# Patient Record
Sex: FEMALE | Race: ASIAN | Employment: UNEMPLOYED | URBAN - METROPOLITAN AREA
[De-identification: names, ages, dates, MRNs, and addresses within clinical notes are randomized per-mention and may not be internally consistent; named-entity substitution may affect disease eponyms.]

---

## 2017-04-12 ENCOUNTER — OFFICE VISIT (OUTPATIENT)
Dept: FAMILY MEDICINE CLINIC | Facility: CLINIC | Age: 47
End: 2017-04-12

## 2017-04-12 ENCOUNTER — LAB ENCOUNTER (OUTPATIENT)
Dept: LAB | Age: 47
End: 2017-04-12
Attending: FAMILY MEDICINE
Payer: COMMERCIAL

## 2017-04-12 VITALS
BODY MASS INDEX: 18.78 KG/M2 | OXYGEN SATURATION: 100 % | DIASTOLIC BLOOD PRESSURE: 70 MMHG | HEART RATE: 76 BPM | SYSTOLIC BLOOD PRESSURE: 114 MMHG | RESPIRATION RATE: 16 BRPM | TEMPERATURE: 99 F | HEIGHT: 60.75 IN | WEIGHT: 98.19 LBS

## 2017-04-12 DIAGNOSIS — R63.4 UNEXPLAINED WEIGHT LOSS: Primary | ICD-10-CM

## 2017-04-12 DIAGNOSIS — N92.1 MENORRHAGIA WITH IRREGULAR CYCLE: ICD-10-CM

## 2017-04-12 DIAGNOSIS — K21.00 GASTROESOPHAGEAL REFLUX DISEASE WITH ESOPHAGITIS: ICD-10-CM

## 2017-04-12 DIAGNOSIS — R63.4 UNEXPLAINED WEIGHT LOSS: ICD-10-CM

## 2017-04-12 PROCEDURE — 83550 IRON BINDING TEST: CPT

## 2017-04-12 PROCEDURE — 86800 THYROGLOBULIN ANTIBODY: CPT

## 2017-04-12 PROCEDURE — 83540 ASSAY OF IRON: CPT

## 2017-04-12 PROCEDURE — 86376 MICROSOMAL ANTIBODY EACH: CPT

## 2017-04-12 PROCEDURE — 82607 VITAMIN B-12: CPT

## 2017-04-12 PROCEDURE — 84443 ASSAY THYROID STIM HORMONE: CPT

## 2017-04-12 PROCEDURE — 82306 VITAMIN D 25 HYDROXY: CPT

## 2017-04-12 PROCEDURE — 80053 COMPREHEN METABOLIC PANEL: CPT

## 2017-04-12 PROCEDURE — 99214 OFFICE O/P EST MOD 30 MIN: CPT | Performed by: FAMILY MEDICINE

## 2017-04-12 PROCEDURE — 84481 FREE ASSAY (FT-3): CPT

## 2017-04-12 PROCEDURE — 82728 ASSAY OF FERRITIN: CPT

## 2017-04-12 PROCEDURE — 84439 ASSAY OF FREE THYROXINE: CPT

## 2017-04-12 PROCEDURE — 85025 COMPLETE CBC W/AUTO DIFF WBC: CPT

## 2017-04-12 PROCEDURE — 36415 COLL VENOUS BLD VENIPUNCTURE: CPT

## 2017-04-12 RX ORDER — SUCRALFATE 1 G/1
1 TABLET ORAL
Qty: 120 TABLET | Refills: 1 | Status: SHIPPED | OUTPATIENT
Start: 2017-04-12 | End: 2018-10-05 | Stop reason: ALTCHOICE

## 2017-04-12 NOTE — PROGRESS NOTES
HPI:   Alen De Paz is a 50year old female here with gi issues, chest pain and heavy menses      Pt reports a normal cardiac work up recently at [facility]; pt is still having the chest pain. bp was high at times.      Pt was not happy living ove apparent distress  CARDIO: RRR without murmur  LUNGS: clear to auscultation  NECK: supple,no adenopathy,no thyromegaly  HEENT: atraumatic, normocephalic,ears and throat are clear  EYES:PERRLA, EOMI, normal,conjunctiva are clear  SKIN: norashes,no suspiciou

## 2017-04-17 ENCOUNTER — PATIENT MESSAGE (OUTPATIENT)
Dept: FAMILY MEDICINE CLINIC | Facility: CLINIC | Age: 47
End: 2017-04-17

## 2017-04-17 NOTE — TELEPHONE ENCOUNTER
From: Reji Young  To: Matthew Newberry DO  Sent: 4/17/2017 7:44 AM CDT  Subject: Prescription Question    Dear Dr. Candelaria Hicks,    I noticed that I am deficient in one of thyroid hormones and iron.  I have bumped up the dose of iron during my period days but

## 2017-04-27 ENCOUNTER — HOSPITAL ENCOUNTER (OUTPATIENT)
Dept: ULTRASOUND IMAGING | Age: 47
Discharge: HOME | End: 2017-04-27
Attending: FAMILY MEDICINE
Payer: COMMERCIAL

## 2017-04-27 DIAGNOSIS — K21.00 GASTROESOPHAGEAL REFLUX DISEASE WITH ESOPHAGITIS: ICD-10-CM

## 2017-04-27 DIAGNOSIS — N92.1 MENORRHAGIA WITH IRREGULAR CYCLE: ICD-10-CM

## 2017-04-27 DIAGNOSIS — R63.4 UNEXPLAINED WEIGHT LOSS: ICD-10-CM

## 2017-04-27 PROCEDURE — 76700 US EXAM ABDOM COMPLETE: CPT

## 2017-04-27 PROCEDURE — 76856 US EXAM PELVIC COMPLETE: CPT

## 2017-04-27 PROCEDURE — 76830 TRANSVAGINAL US NON-OB: CPT

## 2017-04-27 PROCEDURE — 93975 VASCULAR STUDY: CPT

## 2017-05-02 ENCOUNTER — OFFICE VISIT (OUTPATIENT)
Dept: FAMILY MEDICINE CLINIC | Facility: CLINIC | Age: 47
End: 2017-05-02

## 2017-05-02 VITALS
HEIGHT: 61 IN | SYSTOLIC BLOOD PRESSURE: 100 MMHG | BODY MASS INDEX: 19.07 KG/M2 | RESPIRATION RATE: 18 BRPM | WEIGHT: 101 LBS | TEMPERATURE: 99 F | HEART RATE: 72 BPM | DIASTOLIC BLOOD PRESSURE: 60 MMHG

## 2017-05-02 DIAGNOSIS — R10.13 EPIGASTRIC PAIN: ICD-10-CM

## 2017-05-02 DIAGNOSIS — D50.0 IRON DEFICIENCY ANEMIA DUE TO CHRONIC BLOOD LOSS: ICD-10-CM

## 2017-05-02 DIAGNOSIS — R63.4 UNEXPLAINED WEIGHT LOSS: ICD-10-CM

## 2017-05-02 DIAGNOSIS — D25.9 UTERINE LEIOMYOMA, UNSPECIFIED LOCATION: Primary | ICD-10-CM

## 2017-05-02 PROCEDURE — 99214 OFFICE O/P EST MOD 30 MIN: CPT | Performed by: FAMILY MEDICINE

## 2017-05-02 RX ORDER — LACTOBACIL 2/BIFIDO 1/S.THERMO 450B CELL
1 PACKET (EA) ORAL 2 TIMES DAILY
Qty: 180 PACKET | Refills: 3 | Status: SHIPPED | OUTPATIENT
Start: 2017-05-02

## 2017-05-02 RX ORDER — LACTOBACIL 2/BIFIDO 1/S.THERMO 450B CELL
1 PACKET (EA) ORAL DAILY
Qty: 90 PACKET | Refills: 1 | Status: SHIPPED | OUTPATIENT
Start: 2017-05-02 | End: 2017-05-02

## 2017-05-02 NOTE — PROGRESS NOTES
HPI:   Isaiah Kincaid is a 50year old female here with gi issues and heavy menses      Pt reports a normal cardiac work up recently at [facility]; pt is still having the chest pain. bp was high at times.      [place] / [place] 04/28/2017  Body mass index is 19.09 kg/(m^2).    GENERAL: alert and oriented X 3, well developed, well nourished,in no apparent distress  CARDIO: RRR without murmur  LUNGS: clear to auscultation  NECK: supple,no adenopathy,no thyromegaly  HEENT: atraumatic

## 2017-06-26 ENCOUNTER — TELEPHONE (OUTPATIENT)
Dept: FAMILY MEDICINE CLINIC | Facility: CLINIC | Age: 47
End: 2017-06-26

## 2017-06-26 DIAGNOSIS — R13.10 PROBLEMS WITH SWALLOWING AND MASTICATION: Primary | ICD-10-CM

## 2017-10-02 ENCOUNTER — TELEPHONE (OUTPATIENT)
Dept: FAMILY MEDICINE CLINIC | Facility: CLINIC | Age: 47
End: 2017-10-02

## 2017-10-02 DIAGNOSIS — R13.10 PROBLEMS WITH SWALLOWING AND MASTICATION: Primary | ICD-10-CM

## 2017-10-02 NOTE — TELEPHONE ENCOUNTER
Patient needs referral to see Dr Augusto Solomon at [facility]. Was referred in June, but there was insurance difficulties.   Insurance is now fixed and she needs to be seen      Internal  Diagnosis: R13.10  4 vusuts

## 2017-10-06 ENCOUNTER — TELEPHONE (OUTPATIENT)
Dept: FAMILY MEDICINE CLINIC | Facility: CLINIC | Age: 47
End: 2017-10-06

## 2017-10-06 DIAGNOSIS — Z12.39 SCREENING BREAST EXAMINATION: Primary | ICD-10-CM

## 2017-10-06 NOTE — TELEPHONE ENCOUNTER
calling, would like order for mammogram for his wife.     Please call with questions or when order in system

## 2017-10-13 ENCOUNTER — HOSPITAL ENCOUNTER (OUTPATIENT)
Dept: MAMMOGRAPHY | Age: 47
Discharge: HOME | End: 2017-10-13
Attending: FAMILY MEDICINE
Payer: COMMERCIAL

## 2017-10-13 DIAGNOSIS — Z12.39 SCREENING BREAST EXAMINATION: ICD-10-CM

## 2017-10-13 PROBLEM — Z86.19 HISTORY OF HELICOBACTER PYLORI INFECTION: Status: ACTIVE | Noted: 2017-10-13

## 2017-10-13 PROBLEM — D50.0 IRON DEFICIENCY ANEMIA DUE TO CHRONIC BLOOD LOSS: Status: ACTIVE | Noted: 2017-10-13

## 2017-10-13 PROCEDURE — 77063 BREAST TOMOSYNTHESIS BI: CPT | Performed by: FAMILY MEDICINE

## 2017-10-13 PROCEDURE — 77067 SCR MAMMO BI INCL CAD: CPT | Performed by: FAMILY MEDICINE

## 2017-10-24 ENCOUNTER — HOSPITAL ENCOUNTER (OUTPATIENT)
Dept: MAMMOGRAPHY | Age: 47
Discharge: HOME | End: 2017-10-24
Attending: FAMILY MEDICINE
Payer: COMMERCIAL

## 2017-10-24 ENCOUNTER — HOSPITAL ENCOUNTER (OUTPATIENT)
Dept: ULTRASOUND IMAGING | Age: 47
Discharge: HOME | End: 2017-10-24
Attending: FAMILY MEDICINE
Payer: COMMERCIAL

## 2017-10-24 ENCOUNTER — TELEPHONE (OUTPATIENT)
Dept: FAMILY MEDICINE CLINIC | Facility: CLINIC | Age: 47
End: 2017-10-24

## 2017-10-24 DIAGNOSIS — R92.1 BREAST CALCIFICATIONS: Primary | ICD-10-CM

## 2017-10-24 DIAGNOSIS — R92.2 INCONCLUSIVE MAMMOGRAM: ICD-10-CM

## 2017-10-24 PROCEDURE — 77061 BREAST TOMOSYNTHESIS UNI: CPT | Performed by: FAMILY MEDICINE

## 2017-10-24 PROCEDURE — 76641 ULTRASOUND BREAST COMPLETE: CPT | Performed by: FAMILY MEDICINE

## 2017-10-24 PROCEDURE — 77065 DX MAMMO INCL CAD UNI: CPT | Performed by: FAMILY MEDICINE

## 2017-10-24 NOTE — TELEPHONE ENCOUNTER
Received call from Dr. Jada Waddell- patient needs right breast biopsy- 2 sites.  Please see results and advise

## 2017-10-24 NOTE — TELEPHONE ENCOUNTER
CALLING STATING THAT WIFE NEEDS ORDERS FOR BIOPSY FOR CALCIUM CLUSTER R BREAST. PT WAS CALLED TO COME BACK TO [facility] FOR THIS TESTING. CALL ONCE THE ORDERS HAVE BEEN ENTERED IN SYSTEM.

## 2017-10-24 NOTE — IMAGING NOTE
Assisted Dr. Ryann Gamino with Recommendation for a 2 site stereotactic breast biopsy for calcifications. Emotional and educational support provided. Written information provided to Ezekiel Bear and her  (via phone) and they verbalized understanding.

## 2017-11-06 ENCOUNTER — TELEPHONE (OUTPATIENT)
Dept: FAMILY MEDICINE CLINIC | Facility: CLINIC | Age: 47
End: 2017-11-06

## 2017-11-06 ENCOUNTER — HOSPITAL ENCOUNTER (OUTPATIENT)
Dept: MAMMOGRAPHY | Facility: HOSPITAL | Age: 47
Discharge: HOME | End: 2017-11-06
Attending: FAMILY MEDICINE
Payer: COMMERCIAL

## 2017-11-06 DIAGNOSIS — R92.1 BREAST CALCIFICATIONS: ICD-10-CM

## 2017-11-06 DIAGNOSIS — R07.89 ATYPICAL CHEST PAIN: Primary | ICD-10-CM

## 2017-11-06 DIAGNOSIS — Z01.818 PRE-OPERATIVE CLEARANCE: ICD-10-CM

## 2017-11-06 PROCEDURE — 88305 TISSUE EXAM BY PATHOLOGIST: CPT | Performed by: FAMILY MEDICINE

## 2017-11-06 PROCEDURE — 19082 BX BREAST ADD LESION STRTCTC: CPT | Performed by: FAMILY MEDICINE

## 2017-11-06 PROCEDURE — 19081 BX BREAST 1ST LESION STRTCTC: CPT | Performed by: FAMILY MEDICINE

## 2017-11-06 NOTE — TELEPHONE ENCOUNTER
Per patient's  she has been complaining of atypical chest pain like she told GI. Upper left. How long? Refer to Dr. Sil Miguel?

## 2017-11-06 NOTE — TELEPHONE ENCOUNTER
GI notes states \"Recommend EGD and colonoscopy with possible biopsy with MAC anesthesia. Informed consent was obtained from the patient for the above procedures.  She was told indication, nature and outcome, alternatives, risks, and complications including

## 2017-11-08 ENCOUNTER — TELEPHONE (OUTPATIENT)
Dept: MAMMOGRAPHY | Facility: HOSPITAL | Age: 47
End: 2017-11-08

## 2017-11-08 NOTE — TELEPHONE ENCOUNTER
Telephoned Ravin Pierre and name,  verified with pt who gave verbal permission to speak with her , Zeenat Newman re right breast stereotactic results. Notified Ravin Pierre of benign  breast biopsy results for both sites.   Ravin Pierre reports bio

## 2018-04-19 ENCOUNTER — OFFICE VISIT (OUTPATIENT)
Dept: FAMILY MEDICINE CLINIC | Facility: CLINIC | Age: 48
End: 2018-04-19

## 2018-04-19 VITALS
BODY MASS INDEX: 19.63 KG/M2 | DIASTOLIC BLOOD PRESSURE: 88 MMHG | WEIGHT: 104 LBS | TEMPERATURE: 99 F | HEART RATE: 72 BPM | HEIGHT: 61 IN | SYSTOLIC BLOOD PRESSURE: 128 MMHG | RESPIRATION RATE: 20 BRPM | OXYGEN SATURATION: 98 %

## 2018-04-19 DIAGNOSIS — N92.0 MENORRHAGIA WITH REGULAR CYCLE: ICD-10-CM

## 2018-04-19 DIAGNOSIS — Z12.11 COLON CANCER SCREENING: ICD-10-CM

## 2018-04-19 DIAGNOSIS — Z00.00 ANNUAL PHYSICAL EXAM: ICD-10-CM

## 2018-04-19 DIAGNOSIS — Z01.419 WELL WOMAN EXAM: Primary | ICD-10-CM

## 2018-04-19 DIAGNOSIS — Z12.31 ENCOUNTER FOR SCREENING MAMMOGRAM FOR HIGH-RISK PATIENT: ICD-10-CM

## 2018-04-19 DIAGNOSIS — G47.09 OTHER INSOMNIA: ICD-10-CM

## 2018-04-19 DIAGNOSIS — D25.9 UTERINE LEIOMYOMA, UNSPECIFIED LOCATION: ICD-10-CM

## 2018-04-19 PROCEDURE — 87624 HPV HI-RISK TYP POOLED RSLT: CPT | Performed by: FAMILY MEDICINE

## 2018-04-19 PROCEDURE — G0438 PPPS, INITIAL VISIT: HCPCS | Performed by: FAMILY MEDICINE

## 2018-04-19 PROCEDURE — 99396 PREV VISIT EST AGE 40-64: CPT | Performed by: FAMILY MEDICINE

## 2018-04-19 PROCEDURE — 88175 CYTOPATH C/V AUTO FLUID REDO: CPT | Performed by: FAMILY MEDICINE

## 2018-04-19 RX ORDER — TRAZODONE HYDROCHLORIDE 50 MG/1
50 TABLET ORAL NIGHTLY
Qty: 30 TABLET | Refills: 0 | Status: SHIPPED | OUTPATIENT
Start: 2018-04-19 | End: 2018-10-05 | Stop reason: ALTCHOICE

## 2018-04-19 RX ORDER — TRAZODONE HYDROCHLORIDE 50 MG/1
TABLET ORAL
Qty: 90 TABLET | Refills: 0 | OUTPATIENT
Start: 2018-04-19

## 2018-04-19 NOTE — PROGRESS NOTES
HPI:   Mary Prather is a 46year old female who presents for a complete physical exam.     Wt Readings from Last 6 Encounters:  04/19/18 : 104 lb  10/13/17 : 106 lb  05/02/17 : 101 lb  04/12/17 : 98 lb 3.2 oz  02/05/16 : 110 lb  02/03/15 : 114 lb    Body Smoker                                                              Smokeless tobacco: Never Used                      Alcohol use: No              Occ: . : 2.  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  Diet: watches calories closely     REVIEW OF 1. Well woman exam      2. Annual physical exam    - FREE T4 (FREE THYROXINE); Future  - ASSAY, THYROID STIM HORMONE; Future  - LIPID PANEL; Future  - CBC WITH DIFFERENTIAL WITH PLATELET;  Future  - VITAMIN B12; Future  - VITAMIN D, 25-HYDROXY; Future

## 2018-06-19 DIAGNOSIS — R10.13 EPIGASTRIC PAIN: ICD-10-CM

## 2018-06-19 RX ORDER — LACTOBACIL 2/BIFIDO 1/S.THERMO 450B CELL
PACKET (EA) ORAL
Qty: 180 PACKET | Refills: 0 | OUTPATIENT
Start: 2018-06-19

## 2018-06-20 RX ORDER — LACTOBACIL 2/BIFIDO 1/S.THERMO 450B CELL
1 PACKET (EA) ORAL 2 TIMES DAILY
Qty: 180 PACKET | Refills: 3 | OUTPATIENT
Start: 2018-06-20

## 2018-06-20 NOTE — TELEPHONE ENCOUNTER
Pharmacy stated pt went to  her prescription for VSL # 3 and was told by pharmacy that it was refused/denied. Pt told pharmacy that it should be approved and asked them to call to get the approval. Pharmacy wants clarification. Please call and advise.

## 2018-07-12 ENCOUNTER — LAB ENCOUNTER (OUTPATIENT)
Dept: LAB | Age: 48
End: 2018-07-12
Attending: FAMILY MEDICINE
Payer: COMMERCIAL

## 2018-07-12 DIAGNOSIS — Z00.00 ANNUAL PHYSICAL EXAM: ICD-10-CM

## 2018-07-12 LAB
25-HYDROXYVITAMIN D (TOTAL): 69.7 NG/ML (ref 30–100)
ALBUMIN SERPL-MCNC: 3.6 G/DL (ref 3.5–4.8)
ALP LIVER SERPL-CCNC: 81 U/L (ref 39–100)
ALT SERPL-CCNC: 29 U/L (ref 14–54)
AST SERPL-CCNC: 22 U/L (ref 15–41)
BASOPHILS # BLD AUTO: 0.01 X10(3) UL (ref 0–0.1)
BASOPHILS NFR BLD AUTO: 0.2 %
BILIRUB SERPL-MCNC: 0.4 MG/DL (ref 0.1–2)
BUN BLD-MCNC: 9 MG/DL (ref 8–20)
CALCIUM BLD-MCNC: 9.2 MG/DL (ref 8.3–10.3)
CHLORIDE: 107 MMOL/L (ref 101–111)
CHOLEST SMN-MCNC: 240 MG/DL (ref ?–200)
CO2: 27 MMOL/L (ref 22–32)
CREAT BLD-MCNC: 0.77 MG/DL (ref 0.55–1.02)
DEPRECATED HBV CORE AB SER IA-ACNC: 9.9 NG/ML (ref 18–340)
EOSINOPHIL # BLD AUTO: 0.11 X10(3) UL (ref 0–0.3)
EOSINOPHIL NFR BLD AUTO: 2.4 %
ERYTHROCYTE [DISTWIDTH] IN BLOOD BY AUTOMATED COUNT: 12.2 % (ref 11.5–16)
FREE T4: 0.8 NG/DL (ref 0.9–1.8)
GLUCOSE BLD-MCNC: 96 MG/DL (ref 70–99)
HAV AB SERPL IA-ACNC: 1924 PG/ML (ref 193–986)
HCT VFR BLD AUTO: 37.7 % (ref 34–50)
HDLC SERPL-MCNC: 66 MG/DL (ref 45–?)
HDLC SERPL: 3.64 {RATIO} (ref ?–4.44)
HGB BLD-MCNC: 11.7 G/DL (ref 12–16)
IMMATURE GRANULOCYTE COUNT: 0.01 X10(3) UL (ref 0–1)
IMMATURE GRANULOCYTE RATIO %: 0.2 %
IRON SATURATION: 12 % (ref 20–50)
IRON: 61 UG/DL (ref 28–170)
LDLC SERPL CALC-MCNC: 149 MG/DL (ref ?–130)
LYMPHOCYTES # BLD AUTO: 2.23 X10(3) UL (ref 0.9–4)
LYMPHOCYTES NFR BLD AUTO: 48.3 %
M PROTEIN MFR SERPL ELPH: 7.6 G/DL (ref 6.1–8.3)
MCH RBC QN AUTO: 29.2 PG (ref 27–33.2)
MCHC RBC AUTO-ENTMCNC: 31 G/DL (ref 31–37)
MCV RBC AUTO: 94 FL (ref 81–100)
MONOCYTES # BLD AUTO: 0.37 X10(3) UL (ref 0.1–1)
MONOCYTES NFR BLD AUTO: 8 %
NEUTROPHIL ABS PRELIM: 1.89 X10 (3) UL (ref 1.3–6.7)
NEUTROPHILS # BLD AUTO: 1.89 X10(3) UL (ref 1.3–6.7)
NEUTROPHILS NFR BLD AUTO: 40.9 %
NONHDLC SERPL-MCNC: 174 MG/DL (ref ?–130)
PLATELET # BLD AUTO: 293 10(3)UL (ref 150–450)
POTASSIUM SERPL-SCNC: 3.9 MMOL/L (ref 3.6–5.1)
RBC # BLD AUTO: 4.01 X10(6)UL (ref 3.8–5.1)
RED CELL DISTRIBUTION WIDTH-SD: 42.5 FL (ref 35.1–46.3)
SODIUM SERPL-SCNC: 141 MMOL/L (ref 136–144)
TOTAL IRON BINDING CAPACITY: 489 UG/DL (ref 240–450)
TRANSFERRIN: 328 MG/DL (ref 200–360)
TRIGL SERPL-MCNC: 123 MG/DL (ref ?–150)
TSI SER-ACNC: 1.59 MIU/ML (ref 0.35–5.5)
VLDLC SERPL CALC-MCNC: 25 MG/DL (ref 5–40)
WBC # BLD AUTO: 4.6 X10(3) UL (ref 4–13)

## 2018-07-12 PROCEDURE — 83550 IRON BINDING TEST: CPT

## 2018-07-12 PROCEDURE — 82306 VITAMIN D 25 HYDROXY: CPT

## 2018-07-12 PROCEDURE — 82607 VITAMIN B-12: CPT

## 2018-07-12 PROCEDURE — 85025 COMPLETE CBC W/AUTO DIFF WBC: CPT

## 2018-07-12 PROCEDURE — 36415 COLL VENOUS BLD VENIPUNCTURE: CPT

## 2018-07-12 PROCEDURE — 80061 LIPID PANEL: CPT

## 2018-07-12 PROCEDURE — 84443 ASSAY THYROID STIM HORMONE: CPT

## 2018-07-12 PROCEDURE — 80053 COMPREHEN METABOLIC PANEL: CPT

## 2018-07-12 PROCEDURE — 84439 ASSAY OF FREE THYROXINE: CPT

## 2018-07-12 PROCEDURE — 83540 ASSAY OF IRON: CPT

## 2018-07-12 PROCEDURE — 82728 ASSAY OF FERRITIN: CPT

## 2018-10-05 ENCOUNTER — OFFICE VISIT (OUTPATIENT)
Dept: FAMILY MEDICINE CLINIC | Facility: CLINIC | Age: 48
End: 2018-10-05

## 2018-10-05 VITALS
RESPIRATION RATE: 16 BRPM | SYSTOLIC BLOOD PRESSURE: 108 MMHG | HEART RATE: 91 BPM | HEIGHT: 61 IN | WEIGHT: 120 LBS | TEMPERATURE: 98 F | OXYGEN SATURATION: 99 % | DIASTOLIC BLOOD PRESSURE: 68 MMHG | BODY MASS INDEX: 22.66 KG/M2

## 2018-10-05 DIAGNOSIS — R21 RASH: Primary | ICD-10-CM

## 2018-10-05 PROCEDURE — 99213 OFFICE O/P EST LOW 20 MIN: CPT | Performed by: FAMILY MEDICINE

## 2018-10-05 RX ORDER — CLOTRIMAZOLE AND BETAMETHASONE DIPROPIONATE 10; .64 MG/G; MG/G
CREAM TOPICAL
Qty: 45 G | Refills: 0 | Status: SHIPPED | OUTPATIENT
Start: 2018-10-05

## 2018-10-05 NOTE — PROGRESS NOTES
HPI:   Ezekiel Bear is a 46year old female who presents with rash    Bilateral groin   One month  It is burning   otc - antifungal steroid and abx have not helped     Pt has recently started to wear panty liners     It comes and goes but it will return PLAN:   Jaime Huang is a 46year old female who presents for     1.  Rash  Stop use of panty liners   - clotrimazole-betamethasone 1-0.05 % External Cream; Apply bid as needed  Dispense: 45 g; Refill: 0    uestions answered and patient indicates Cheryl Melton

## (undated) NOTE — LETTER
05/19/18        Lesa Salinas  [address]  [place] [address]      Dear Ashley Raza records indicate that you have outstanding lab work and or testing that was ordered for you and has not yet been completed:          T4 FREE [866]      TSH

## (undated) NOTE — Clinical Note
06/01/2017        Dennis Parisi  [address]       Dear [facility] records indicate that you have outstanding lab work and or testing that was ordered for you and has not yet been completed:      CBC W Differential W Platel

## (undated) NOTE — MR AVS SNAPSHOT
[address]  [address] [facility], [address] [address] [phone]               Thank you for choosing us for your health care visit with Malon Crigler, DO.   We are glad to serve you and happy to provide you with this brar Iron And Tibc    Complete by:  Apr 12, 2017 (Approximate)    Assoc Dx:   Unexplained weight loss [R63.4], Gastroesophageal reflux disease with esophagitis [K21.0], Menorrhagia with irregular cycle [N92.1]           Ferritin    Complete by:  Apr 12, 2017 (A schedule your appointment. If you are confident that your benefit plan will not require a referral or authorization, such as [place] Medicaid, please feel free to schedule your appointment immediately.  However, if you are unsure about the requirements If you've recently had a stay at the Hospital you can access your discharge instructions in [id number] by going to Visits < Admission Summaries.  If you've been to the Emergency Department or your doctor's office, you can view your past visit information in My

## (undated) NOTE — MR AVS SNAPSHOT
[address]  [address] [facility], [address] [address] [phone]               Thank you for choosing us for your health care visit with Cody Dumont DO.   We are glad to serve you and happy to provide you with this brar Your physician or the clinic staff will provide you with the phone number you should call to schedule your appointment. [address]  [address], [address]  [address] Epigastric pain        Unexplained weight loss        Iron deficiency anemia due to chronic blood loss          Instructions and Information about Your Health     None      Allergies as of May 02, 2017     Flagyl [Metronidazole]                 Today's Vi